# Patient Record
Sex: FEMALE | Race: ASIAN | NOT HISPANIC OR LATINO
[De-identification: names, ages, dates, MRNs, and addresses within clinical notes are randomized per-mention and may not be internally consistent; named-entity substitution may affect disease eponyms.]

---

## 2023-06-28 ENCOUNTER — APPOINTMENT (OUTPATIENT)
Dept: INTERNAL MEDICINE | Facility: CLINIC | Age: 68
End: 2023-06-28

## 2024-02-02 ENCOUNTER — APPOINTMENT (OUTPATIENT)
Dept: INTERNAL MEDICINE | Facility: CLINIC | Age: 69
End: 2024-02-02
Payer: MEDICARE

## 2024-02-02 VITALS
OXYGEN SATURATION: 98 % | DIASTOLIC BLOOD PRESSURE: 80 MMHG | WEIGHT: 175 LBS | SYSTOLIC BLOOD PRESSURE: 146 MMHG | HEART RATE: 78 BPM | TEMPERATURE: 98.5 F

## 2024-02-02 DIAGNOSIS — M19.019 PRIMARY OSTEOARTHRITIS, UNSPECIFIED SHOULDER: ICD-10-CM

## 2024-02-02 DIAGNOSIS — Z00.00 ENCOUNTER FOR GENERAL ADULT MEDICAL EXAMINATION W/OUT ABNORMAL FINDINGS: ICD-10-CM

## 2024-02-02 DIAGNOSIS — Z80.9 FAMILY HISTORY OF MALIGNANT NEOPLASM, UNSPECIFIED: ICD-10-CM

## 2024-02-02 DIAGNOSIS — M17.0 BILATERAL PRIMARY OSTEOARTHRITIS OF KNEE: ICD-10-CM

## 2024-02-02 DIAGNOSIS — M19.049 PRIMARY OSTEOARTHRITIS, UNSPECIFIED HAND: ICD-10-CM

## 2024-02-02 DIAGNOSIS — Z63.4 DISAPPEARANCE AND DEATH OF FAMILY MEMBER: ICD-10-CM

## 2024-02-02 DIAGNOSIS — Z23 ENCOUNTER FOR IMMUNIZATION: ICD-10-CM

## 2024-02-02 DIAGNOSIS — Z84.89 FAMILY HISTORY OF OTHER SPECIFIED CONDITIONS: ICD-10-CM

## 2024-02-02 DIAGNOSIS — Z83.3 FAMILY HISTORY OF DIABETES MELLITUS: ICD-10-CM

## 2024-02-02 PROCEDURE — 36415 COLL VENOUS BLD VENIPUNCTURE: CPT

## 2024-02-02 PROCEDURE — G0439: CPT

## 2024-02-02 RX ORDER — PEN NEEDLE, DIABETIC 33 GX5/32"
NEEDLE, DISPOSABLE MISCELLANEOUS
Qty: 1 | Refills: 0 | Status: ACTIVE | COMMUNITY
Start: 2024-02-02 | End: 1900-01-01

## 2024-02-02 RX ORDER — UBIDECARENONE 30 MG
CAPSULE ORAL DAILY
Qty: 90 | Refills: 3 | Status: ACTIVE | COMMUNITY
Start: 2024-02-02 | End: 1900-01-01

## 2024-02-02 RX ORDER — HYDROXYCHLOROQUINE SULFATE 200 MG/1
200 TABLET, FILM COATED ORAL
Refills: 0 | Status: ACTIVE | COMMUNITY

## 2024-02-02 RX ORDER — PREDNISONE 5 MG/1
5 TABLET ORAL
Refills: 0 | Status: ACTIVE | COMMUNITY

## 2024-02-02 RX ORDER — DICLOFENAC SODIUM 1% 10 MG/G
1 GEL TOPICAL
Qty: 100 | Refills: 3 | Status: ACTIVE | COMMUNITY
Start: 2024-02-02 | End: 1900-01-01

## 2024-02-02 SDOH — SOCIAL STABILITY - SOCIAL INSECURITY: DISSAPEARANCE AND DEATH OF FAMILY MEMBER: Z63.4

## 2024-02-02 NOTE — HISTORY OF PRESENT ILLNESS
[de-identified] : Seen as New pt to \[facility]\"" care  referred by Daughter My Pt   hx HTN x 8 yrs now - on lisinopril 20 BID   hx SLE -dx 8 yrs ago ON Plaquenil 200 qd and prednisone 5 qd - followed by Rheum Dr Lee Given [facility]  -on going joint pains   HX hypothyroidism - on levothyroxine

## 2024-02-02 NOTE — PHYSICAL EXAM
[No Acute Distress] : no acute distress [Well-Appearing] : well-appearing [Normal Sclera/Conjunctiva] : normal sclera/conjunctiva [PERRL] : pupils equal round and reactive to light [EOMI] : extraocular movements intact [Normal Outer Ear/Nose] : the outer ears and nose were normal in appearance [Normal Oropharynx] : the oropharynx was normal [No JVD] : no jugular venous distention [No Lymphadenopathy] : no lymphadenopathy [Supple] : supple [Thyroid Normal, No Nodules] : the thyroid was normal and there were no nodules present [No Respiratory Distress] : no respiratory distress  [No Accessory Muscle Use] : no accessory muscle use [Clear to Auscultation] : lungs were clear to auscultation bilaterally [Normal Rate] : normal rate  [Regular Rhythm] : with a regular rhythm [Normal S1, S2] : normal S1 and S2 [No Murmur] : no murmur heard [No Varicosities] : no varicosities [Pedal Pulses Present] : the pedal pulses are present [No Edema] : there was no peripheral edema [No Extremity Clubbing/Cyanosis] : no extremity clubbing/cyanosis [Soft] : abdomen soft [Non Tender] : non-tender [Non-distended] : non-distended [No Masses] : no abdominal mass palpated [No HSM] : no HSM [Normal Bowel Sounds] : normal bowel sounds [Normal Posterior Cervical Nodes] : no posterior cervical lymphadenopathy [Normal Anterior Cervical Nodes] : no anterior cervical lymphadenopathy [No CVA Tenderness] : no CVA  tenderness [No Spinal Tenderness] : no spinal tenderness [No Joint Swelling] : no joint swelling [Grossly Normal Strength/Tone] : grossly normal strength/tone [No Rash] : no rash [Coordination Grossly Intact] : coordination grossly intact [No Focal Deficits] : no focal deficits [Normal Gait] : normal gait [Deep Tendon Reflexes (DTR)] : deep tendon reflexes were 2+ and symmetric [Normal Affect] : the affect was normal [Normal Insight/Judgement] : insight and judgment were intact

## 2024-02-02 NOTE — HEALTH RISK ASSESSMENT
[Good] : ~his/her~  mood as  good [No] : In the past 12 months have you used drugs other than those required for medical reasons? No [No falls in past year] : Patient reported no falls in the past year [0] : 2) Feeling down, depressed, or hopeless: Not at all (0) [PHQ-2 Negative - No further assessment needed] : PHQ-2 Negative - No further assessment needed [Patient declined mammogram] : Patient declined mammogram [Patient declined PAP Smear] : Patient declined PAP Smear [Patient declined bone density test] : Patient declined bone density test [Patient declined colonoscopy] : Patient declined colonoscopy [HIV Test offered] : HIV Test offered [Hepatitis C test offered] : Hepatitis C test offered [Alone] : lives alone [With Family] : lives with family [Retired] : retired [Single] : single [Never] : Never [de-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

## 2024-02-02 NOTE — REVIEW OF SYSTEMS
[Fatigue] : fatigue [Joint Pain] : joint pain [Joint Stiffness] : joint stiffness [Joint Swelling] : joint swelling [Negative] : Heme/Lymph [Fever] : no fever [Chills] : no chills

## 2024-02-02 NOTE — ASSESSMENT
[FreeTextEntry1] : HTN- elevated BP readings confirmed  --no cp sob palpitation or dizzy spells , no leg swelling  -C/o ch cough - > 6 months ? lisinopril d/c - start irbesartan 300 qd - add amlodipine 5 qd - bp monitor given  - educated low salt diet , avoid canned , processed and fast food ,chips , bagged items ,  start exercise daily 30- 40 minutes  , loose weight  -f/u 4- 6 weeks check BP and BMP   Hypothyroid - on levo 100 rx renewed - TFT ordered   hx SLE- followed by rheum - records requested  -on prednisone 5 qd and plaquinil 200 qd   Strong fhs cad both brothers Mi in 50;s - cardio evaluation as c/o ACEVEDO   OA multiple joints - knee shoulders and hand- xary on pt portal reviewed  -diclofinac gel 1 % as needed  -MVi and B complex added  -pt refused injections- got once in past   Health maintenance  Mammo- ordered  colonoscope- refused - get FIT  PAP-due - gyn advised - referral given  Bone density -advised  HEP C screening- ordered  STD offered-HIv ordered  Dermatology consult advised -for skin ca screening  Flu vaccine -pt deferred  Prevnar 20 - 2/2/24  Shingrex -pt defereed  Covid vaccine -advised   RTC 1 month

## 2024-02-03 LAB — HIV1+2 AB SPEC QL IA.RAPID: NONREACTIVE

## 2024-02-05 LAB
ALBUMIN SERPL ELPH-MCNC: 4 G/DL
ALP BLD-CCNC: 69 U/L
ALT SERPL-CCNC: 31 U/L
ANION GAP SERPL CALC-SCNC: 18 MMOL/L
APPEARANCE: CLEAR
AST SERPL-CCNC: 32 U/L
BACTERIA: NEGATIVE /HPF
BASOPHILS # BLD AUTO: 0 K/UL
BASOPHILS NFR BLD AUTO: 0 %
BILIRUB SERPL-MCNC: 0.4 MG/DL
BILIRUBIN URINE: NEGATIVE
BLOOD URINE: NEGATIVE
BUN SERPL-MCNC: 15 MG/DL
CALCIUM SERPL-MCNC: 9.2 MG/DL
CAST: 0 /LPF
CHLORIDE SERPL-SCNC: 101 MMOL/L
CHOLEST SERPL-MCNC: 189 MG/DL
CO2 SERPL-SCNC: 21 MMOL/L
COLOR: YELLOW
CREAT SERPL-MCNC: 0.6 MG/DL
EGFR: 98 ML/MIN/1.73M2
EOSINOPHIL # BLD AUTO: 0.01 K/UL
EOSINOPHIL NFR BLD AUTO: 0.2 %
EPITHELIAL CELLS: 6 /HPF
ESTIMATED AVERAGE GLUCOSE: 154 MG/DL
FOLATE SERPL-MCNC: >20 NG/ML
GLUCOSE QUALITATIVE U: 250 MG/DL
GLUCOSE SERPL-MCNC: 255 MG/DL
HBA1C MFR BLD HPLC: 7 %
HBV SURFACE AB SER QL: NONREACTIVE
HBV SURFACE AG SER QL: NONREACTIVE
HCT VFR BLD CALC: 37.3 %
HCV AB SER QL: NONREACTIVE
HCV S/CO RATIO: 0.13 S/CO
HDLC SERPL-MCNC: 73 MG/DL
HGB BLD-MCNC: 12 G/DL
IMM GRANULOCYTES NFR BLD AUTO: 0.5 %
KETONES URINE: NEGATIVE MG/DL
LDLC SERPL CALC-MCNC: 89 MG/DL
LEUKOCYTE ESTERASE URINE: ABNORMAL
LYMPHOCYTES # BLD AUTO: 1.16 K/UL
LYMPHOCYTES NFR BLD AUTO: 18.4 %
MAN DIFF?: NORMAL
MCHC RBC-ENTMCNC: 26 PG
MCHC RBC-ENTMCNC: 32.2 GM/DL
MCV RBC AUTO: 80.9 FL
MICROSCOPIC-UA: NORMAL
MONOCYTES # BLD AUTO: 0.28 K/UL
MONOCYTES NFR BLD AUTO: 4.4 %
NEUTROPHILS # BLD AUTO: 4.82 K/UL
NEUTROPHILS NFR BLD AUTO: 76.5 %
NITRITE URINE: NEGATIVE
NONHDLC SERPL-MCNC: 116 MG/DL
PH URINE: 6
PLATELET # BLD AUTO: 254 K/UL
POTASSIUM SERPL-SCNC: 4.6 MMOL/L
PROT SERPL-MCNC: 7.3 G/DL
PROTEIN URINE: NEGATIVE MG/DL
RBC # BLD: 4.61 M/UL
RBC # FLD: 14 %
RED BLOOD CELLS URINE: 0 /HPF
SODIUM SERPL-SCNC: 140 MMOL/L
SPECIFIC GRAVITY URINE: 1.01
TRIGL SERPL-MCNC: 157 MG/DL
TSH SERPL-ACNC: 0.46 UIU/ML
UROBILINOGEN URINE: 0.2 MG/DL
VIT B12 SERPL-MCNC: 942 PG/ML
WBC # FLD AUTO: 6.3 K/UL
WHITE BLOOD CELLS URINE: 2 /HPF

## 2024-02-07 LAB
M TB IFN-G BLD-IMP: NEGATIVE
QUANTIFERON TB PLUS MITOGEN MINUS NIL: 6.53 IU/ML
QUANTIFERON TB PLUS NIL: 0.03 IU/ML
QUANTIFERON TB PLUS TB1 MINUS NIL: 0.18 IU/ML
QUANTIFERON TB PLUS TB2 MINUS NIL: 0.08 IU/ML

## 2024-02-15 LAB — HEMOCCULT STL QL IA: NEGATIVE

## 2024-02-28 ENCOUNTER — APPOINTMENT (OUTPATIENT)
Dept: INTERNAL MEDICINE | Facility: CLINIC | Age: 69
End: 2024-02-28
Payer: MEDICARE

## 2024-02-28 VITALS
WEIGHT: 178 LBS | HEIGHT: 59 IN | TEMPERATURE: 97.9 F | HEART RATE: 72 BPM | SYSTOLIC BLOOD PRESSURE: 116 MMHG | DIASTOLIC BLOOD PRESSURE: 77 MMHG | BODY MASS INDEX: 35.88 KG/M2 | OXYGEN SATURATION: 98 %

## 2024-02-28 DIAGNOSIS — E11.9 TYPE 2 DIABETES MELLITUS W/OUT COMPLICATIONS: ICD-10-CM

## 2024-02-28 DIAGNOSIS — Z23 ENCOUNTER FOR IMMUNIZATION: ICD-10-CM

## 2024-02-28 DIAGNOSIS — I70.0 ATHEROSCLEROSIS OF AORTA: ICD-10-CM

## 2024-02-28 DIAGNOSIS — M32.8 OTHER FORMS OF SYSTEMIC LUPUS ERYTHEMATOSUS: ICD-10-CM

## 2024-02-28 DIAGNOSIS — E03.9 HYPOTHYROIDISM, UNSPECIFIED: ICD-10-CM

## 2024-02-28 PROCEDURE — G0009: CPT

## 2024-02-28 PROCEDURE — G2211 COMPLEX E/M VISIT ADD ON: CPT

## 2024-02-28 PROCEDURE — 99214 OFFICE O/P EST MOD 30 MIN: CPT

## 2024-02-28 PROCEDURE — 90677 PCV20 VACCINE IM: CPT

## 2024-02-28 RX ORDER — CYANOCOBALAMIN/FOLIC AC/VIT B6 0.2-.5-5MG
500-5-200 TABLET ORAL DAILY
Qty: 90 | Refills: 3 | Status: COMPLETED | COMMUNITY
Start: 2024-02-02 | End: 2024-02-28

## 2024-02-28 RX ORDER — LANCETS 28 GAUGE
EACH MISCELLANEOUS
Qty: 1 | Refills: 6 | Status: ACTIVE | COMMUNITY
Start: 2024-02-09 | End: 1900-01-01

## 2024-02-28 RX ORDER — PEN NEEDLE, DIABETIC 33 GX5/32"
NEEDLE, DISPOSABLE MISCELLANEOUS
Qty: 1 | Refills: 0 | Status: ACTIVE | COMMUNITY
Start: 2024-02-28 | End: 1900-01-01

## 2024-02-28 RX ORDER — BLOOD-GLUCOSE METER
KIT MISCELLANEOUS
Qty: 1 | Refills: 0 | Status: ACTIVE | COMMUNITY
Start: 2024-02-09 | End: 1900-01-01

## 2024-02-28 RX ORDER — BLOOD SUGAR DIAGNOSTIC
STRIP MISCELLANEOUS
Qty: 60 | Refills: 6 | Status: ACTIVE | COMMUNITY
Start: 2024-02-09 | End: 1900-01-01

## 2024-02-28 RX ORDER — LISINOPRIL 20 MG/1
20 TABLET ORAL
Qty: 180 | Refills: 1 | Status: DISCONTINUED | COMMUNITY
End: 2024-02-28

## 2024-02-28 NOTE — ASSESSMENT
[FreeTextEntry1] : Atypical CP  - saw cardio 2/7/24  -has appt for stress test 3/1/24  -Abdominal US - 2/7/24- atherosclerotic aorta  -ECHO 2/7/24- EF 55 % Mod LVH , Mild Pulm HTN,  Carotid Doppler 2/7/24 - Mild plaque b/l   ASCVD- risk 21 % statins advised  LDL- 2/2024 89  Diabetis new onset Type 2 -AIc -7-2/22/24  on prednisone - started metformin  po BID with food -eat 1800 kcal ADA diet, cut  rice, pasta, sugar, sweet, soda, juices, exercise daily 30 minutes , loose weight   HTN-controlled now on current medications  --no cp sob palpitation or dizzy spells , no leg swelling  -C/o ch cough - > 6 months ? lisinopril d/c - started irbesartan 300 qd 2/22/4  - add amlodipine 5 qd - bp monitor given  - educated low salt diet , avoid canned , processed and fast food ,chips , bagged items ,  start exercise daily 30- 40 minutes  , loose weight  -f/u 4- 6 weeks check BP and BMP   Hypothyroid - on levo 100 rx renewed - TFT ordered   hx SLE- followed by rheum - records requested  -on prednisone 5 qd and plaquinil 200 qd   Strong fhs cad both brothers Mi in 50;s - cardio evaluation as c/o ACEVEDO   OA multiple joints - knee shoulders and hand- xary on pt portal reviewed  -diclofinac gel 1 % as needed  -MVi and B complex added  -pt refused injections- got once in past   Health maintenance  Mammo- ordered  colonoscope- refused -  FIT 2/12/24 neg   PAP-due - gyn advised - referral given  Bone density -advised  HEP C screening- ordered  STD offered-HIv ordered  Dermatology consult advised -for skin ca screening  Flu vaccine -pt deferred  Prevnar 20 - 2/28/24  Shingrex -pt defereed  Covid vaccine -advised   RTC 5/24

## 2024-02-28 NOTE — HISTORY OF PRESENT ILLNESS
[Other: _____] : [unfilled] [de-identified] : Seen as New pt to [facility] 2/2/24  referred by Daughter My Pt   hx HTN x 8 yrs now - started Irbesartan 300 qd BP better readings at home   hx SLE -dx 8 yrs ago ON Plaquenil 200 qd and prednisone 5 qd - followed by Rheum Dr Lee Given [facility]  -on going joint pains   HX hypothyroidism - on levothyroxine

## 2024-03-08 ENCOUNTER — TRANSCRIPTION ENCOUNTER (OUTPATIENT)
Age: 69
End: 2024-03-08

## 2024-04-07 ENCOUNTER — RX RENEWAL (OUTPATIENT)
Age: 69
End: 2024-04-07

## 2024-05-06 ENCOUNTER — APPOINTMENT (OUTPATIENT)
Dept: INTERNAL MEDICINE | Facility: CLINIC | Age: 69
End: 2024-05-06
Payer: MEDICARE

## 2024-05-06 DIAGNOSIS — I10 ESSENTIAL (PRIMARY) HYPERTENSION: ICD-10-CM

## 2024-05-06 DIAGNOSIS — K52.9 NONINFECTIVE GASTROENTERITIS AND COLITIS, UNSPECIFIED: ICD-10-CM

## 2024-05-06 PROCEDURE — 99442: CPT | Mod: 93

## 2024-05-06 RX ORDER — IRBESARTAN 300 MG/1
300 TABLET ORAL
Qty: 90 | Refills: 1 | Status: ACTIVE | COMMUNITY
Start: 2024-02-02 | End: 1900-01-01

## 2024-05-06 RX ORDER — LEVOTHYROXINE SODIUM 0.1 MG/1
100 TABLET ORAL
Qty: 90 | Refills: 1 | Status: ACTIVE | COMMUNITY
Start: 1900-01-01 | End: 1900-01-01

## 2024-05-06 RX ORDER — METFORMIN ER 500 MG 500 MG/1
500 TABLET ORAL
Qty: 180 | Refills: 2 | Status: ACTIVE | COMMUNITY
Start: 2024-02-05 | End: 1900-01-01

## 2024-05-06 RX ORDER — AMLODIPINE BESYLATE 5 MG/1
5 TABLET ORAL
Qty: 90 | Refills: 2 | Status: ACTIVE | COMMUNITY
Start: 2024-02-02 | End: 1900-01-01

## 2024-05-06 NOTE — HISTORY OF PRESENT ILLNESS
[Other Location: e.g. School (Enter Location, City,State)___] : at [unfilled], at the time of the visit. [Medical Office: ([facility])___] : at the medical office located in  [Verbal consent obtained from patient] : the patient, [unfilled] [de-identified] : Seen as New pt to [facility] 2/2/24 - today for f/u on ch issues  referred by Daughter My Pt   diarrhea since 1 week - loose stools discomfort cramps followed by BM  -no fever , no chills   hx HTN x 8 yrs now - started Irbesartan 300 qd BP better readings at home   hx SLE -dx 8 yrs ago ON Plaquenil 200 qd and prednisone 5 qd - followed by Rheum Dr Lee Given [facility]  -on going joint pains   HX hypothyroidism - on levothyroxine

## 2024-05-06 NOTE — ASSESSMENT
[FreeTextEntry1] :  diarrhea - 1 week - adv hydration with pedialyte  -mei diet  - if no improvement adv to see MD Nela Sauceda CP  - saw cardio 2/7/24  -did stress test 3/1/24  -Abdominal US - 2/7/24- atherosclerotic aorta  -ECHO 2/7/24- EF 55 % Mod LVH , Mild Pulm HTN,  Carotid Doppler 2/7/24 - Mild plaque b/l   ASCVD- risk 21 % statins advised  LDL- 2/2024 89  Diabetes new onset Type 2 -AIc -7-2/22/24  on prednisone - started metformin  po BID with food -eat 1800 kcal ADA diet, cut  rice, pasta, sugar, sweet, soda, juices, exercise daily 30 minutes , loose weight   HTN-controlled now on current medications  --no cp sob palpitation or dizzy spells , no leg swelling  -C/o ch cough - > 6 months ? lisinopril d/c - started irbesartan 300 qd 2/22/4  - add amlodipine 5 qd - bp monitor given  - educated low salt diet , avoid canned , processed and fast food ,chips , bagged items ,  start exercise daily 30- 40 minutes  , loose weight  -f/u 4- 6 weeks check BP and BMP   Hypothyroid - on levo 100 rx renewed - TFT ordered   hx SLE- followed by rheum - records requested  -on prednisone 5 qd and plaquinil 200 qd   Strong fhs cad both brothers Mi in 50;s - cardio evaluation as c/o ACEVEDO   OA multiple joints - knee shoulders and hand- xary on pt portal reviewed  -diclofinac gel 1 % as needed  -MVi and B complex added  -pt refused injections- got once in past   Health maintenance  Mammo- ordered  colonoscope- refused -  FIT 2/12/24 neg   PAP-due - gyn advised - referral given  Bone density -advised  HEP C screening- ordered  STD offered-HIv ordered  Dermatology consult advised -for skin ca screening  Flu vaccine -pt deferred  Prevnar 20 - 2/28/24  Shingrex -pt defereed  Covid vaccine -advised

## 2024-05-06 NOTE — REVIEW OF SYSTEMS
[Abdominal Pain] : abdominal pain [Nausea] : no nausea [Constipation] : no constipation [Diarrhea] : diarrhea [Melena] : no melena [Negative] : Respiratory

## 2024-05-08 RX ORDER — OMEPRAZOLE 40 MG/1
40 CAPSULE, DELAYED RELEASE ORAL
Qty: 30 | Refills: 2 | Status: ACTIVE | COMMUNITY
Start: 2024-02-02 | End: 1900-01-01

## 2024-07-03 ENCOUNTER — APPOINTMENT (OUTPATIENT)
Dept: INTERNAL MEDICINE | Facility: CLINIC | Age: 69
End: 2024-07-03

## 2024-07-15 ENCOUNTER — APPOINTMENT (OUTPATIENT)
Dept: INTERNAL MEDICINE | Facility: CLINIC | Age: 69
End: 2024-07-15
Payer: MEDICARE

## 2024-07-15 VITALS
HEIGHT: 59 IN | WEIGHT: 175 LBS | SYSTOLIC BLOOD PRESSURE: 130 MMHG | DIASTOLIC BLOOD PRESSURE: 82 MMHG | HEART RATE: 75 BPM | RESPIRATION RATE: 16 BRPM | BODY MASS INDEX: 35.28 KG/M2 | TEMPERATURE: 98.5 F | OXYGEN SATURATION: 98 %

## 2024-07-15 DIAGNOSIS — M75.101 UNSPECIFIED ROTATOR CUFF TEAR OR RUPTURE OF RIGHT SHOULDER, NOT SPECIFIED AS TRAUMATIC: ICD-10-CM

## 2024-07-15 DIAGNOSIS — E11.9 TYPE 2 DIABETES MELLITUS W/OUT COMPLICATIONS: ICD-10-CM

## 2024-07-15 DIAGNOSIS — E03.9 HYPOTHYROIDISM, UNSPECIFIED: ICD-10-CM

## 2024-07-15 DIAGNOSIS — I70.0 ATHEROSCLEROSIS OF AORTA: ICD-10-CM

## 2024-07-15 DIAGNOSIS — I10 ESSENTIAL (PRIMARY) HYPERTENSION: ICD-10-CM

## 2024-07-15 PROCEDURE — 99214 OFFICE O/P EST MOD 30 MIN: CPT

## 2024-07-15 PROCEDURE — G2211 COMPLEX E/M VISIT ADD ON: CPT

## 2024-07-15 PROCEDURE — 36415 COLL VENOUS BLD VENIPUNCTURE: CPT

## 2024-07-15 RX ORDER — ROSUVASTATIN CALCIUM 5 MG/1
5 TABLET, FILM COATED ORAL
Qty: 90 | Refills: 1 | Status: ACTIVE | COMMUNITY
Start: 2024-07-15 | End: 1900-01-01

## 2024-07-16 ENCOUNTER — TRANSCRIPTION ENCOUNTER (OUTPATIENT)
Age: 69
End: 2024-07-16

## 2024-07-16 LAB
ALBUMIN SERPL ELPH-MCNC: 4.3 G/DL
ALP BLD-CCNC: 70 U/L
ALT SERPL-CCNC: 29 U/L
ANION GAP SERPL CALC-SCNC: 14 MMOL/L
AST SERPL-CCNC: 24 U/L
BILIRUB SERPL-MCNC: 1.1 MG/DL
BUN SERPL-MCNC: 16 MG/DL
CALCIUM SERPL-MCNC: 9.5 MG/DL
CHLORIDE SERPL-SCNC: 102 MMOL/L
CO2 SERPL-SCNC: 26 MMOL/L
CREAT SERPL-MCNC: 0.67 MG/DL
EGFR: 95 ML/MIN/1.73M2
ESTIMATED AVERAGE GLUCOSE: 151 MG/DL
GLUCOSE SERPL-MCNC: 110 MG/DL
HBA1C MFR BLD HPLC: 6.9 %
POTASSIUM SERPL-SCNC: 4 MMOL/L
PROT SERPL-MCNC: 7.5 G/DL
SODIUM SERPL-SCNC: 142 MMOL/L
TSH SERPL-ACNC: 3.19 UIU/ML

## 2024-07-24 ENCOUNTER — RX RENEWAL (OUTPATIENT)
Age: 69
End: 2024-07-24

## 2024-08-19 ENCOUNTER — APPOINTMENT (OUTPATIENT)
Dept: INTERNAL MEDICINE | Facility: CLINIC | Age: 69
End: 2024-08-19
Payer: MEDICARE

## 2024-08-19 VITALS
TEMPERATURE: 97.8 F | RESPIRATION RATE: 16 BRPM | OXYGEN SATURATION: 98 % | SYSTOLIC BLOOD PRESSURE: 121 MMHG | DIASTOLIC BLOOD PRESSURE: 78 MMHG | HEART RATE: 72 BPM | HEIGHT: 59 IN

## 2024-08-19 DIAGNOSIS — I83.893 VARICOSE VEINS OF BILATERAL LOWER EXTREMITIES WITH OTHER COMPLICATIONS: ICD-10-CM

## 2024-08-19 DIAGNOSIS — R22.43 LOCALIZED SWELLING, MASS AND LUMP, LOWER LIMB, BILATERAL: ICD-10-CM

## 2024-08-19 DIAGNOSIS — I10 ESSENTIAL (PRIMARY) HYPERTENSION: ICD-10-CM

## 2024-08-19 DIAGNOSIS — E11.9 TYPE 2 DIABETES MELLITUS W/OUT COMPLICATIONS: ICD-10-CM

## 2024-08-19 DIAGNOSIS — M17.0 BILATERAL PRIMARY OSTEOARTHRITIS OF KNEE: ICD-10-CM

## 2024-08-19 DIAGNOSIS — E03.9 HYPOTHYROIDISM, UNSPECIFIED: ICD-10-CM

## 2024-08-19 PROCEDURE — 99214 OFFICE O/P EST MOD 30 MIN: CPT

## 2024-08-19 PROCEDURE — G2211 COMPLEX E/M VISIT ADD ON: CPT

## 2024-08-19 RX ORDER — FLASH GLUCOSE SCANNING READER
EACH MISCELLANEOUS
Qty: 1 | Refills: 0 | Status: ACTIVE | COMMUNITY
Start: 2024-08-19 | End: 1900-01-01

## 2024-08-19 RX ORDER — FLASH GLUCOSE SENSOR
KIT MISCELLANEOUS
Qty: 6 | Refills: 3 | Status: ACTIVE | COMMUNITY
Start: 2024-08-19 | End: 1900-01-01

## 2024-08-19 NOTE — ASSESSMENT
[FreeTextEntry1] : B/L leg swelling varicose veins  - vascular eval  compression stockings -low salt diet , leg elevation   Pain C spine and right shoulder - ROM restricted - saw Ortho - did MRI rt shoulder reviewed on pt phone - at [facility] 7/12/24 - high grade supraspinatus tear - unable to do ROM exercises  MRI C spine DJD   Atypical CP  - saw cardio 2/7/24  -did stress test 3/1/24  -Abdominal US - 2/7/24- atherosclerotic aorta  -ECHO 2/7/24- EF 55 % Mod LVH , Mild Pulm HTN,  Carotid Doppler 2/7/24 - Mild plaque b/l   ASCVD- risk 21 % statins advised - Crestor 5 mg po qd  LDL- 2/2024 89  Diabetes new onset Type 2 -AIc -7-2/22/24  --> 6.9 7/24 on prednisone - started metformin  po BID with food -eat 1800 kcal ADA diet, cut  rice, pasta, sugar, sweet, soda, juices, exercise daily 30 minutes , loose weight   HTN-controlled now on current medications  --no cp sob palpitation or dizzy spells , no leg swelling  -C/o ch cough - > 6 months ? lisinopril d/c - started irbesartan 300 qd 2/22/4  - add amlodipine 5 qd - bp monitor given (cough resolved ) - educated low salt diet , avoid canned , processed and fast food ,chips , bagged items ,  start exercise daily 30- 40 minutes  , loose weight   Hypothyroid - on levo 100 rx renewed - TFT nl 7/24   hx SLE- followed by rheum - records requested  -on prednisone 5 qd and plaquinil 200 qd   Strong fhs cad both brothers Mi in 50;s - cardio evaluation as c/o ACEVEDO   OA multiple joints - knee shoulders and hand- xary on pt portal reviewed - knee brace given  -diclofinac gel 1 % as needed  -MVi and B complex added  -pt refused injections- got once in past   Health maintenance  Mammo- ordered pending daughter aware  colonoscope- refused -  FIT 2/12/24 neg   PAP-due - gyn advised - referral given  Bone density -advised  HEP C screening-2/2024 neg STD offered-HIv 2/2024 neg  Dermatology consult advised -for skin ca screening  Flu vaccine -pt deferred  Prevnar 20 - 2/28/24  Shingrex -pt defereed  Covid vaccine -advised

## 2024-08-19 NOTE — HISTORY OF PRESENT ILLNESS
[Other: _____] : [unfilled] [de-identified] :  today for f/u on ch issues  Seen as New pt to [facility] 2/2/24 - referred by Daughter My Pt   c/o swelling both legs 2 months ago    right shoulder - ROM restricted - saw Ortho - did MRI rt shoulder - at [facility] 7/12/24 - high grade supraspinatus tear - unable to do ROM exercises   hx HTN x 8 yrs now - started Irbesartan 300 qd BP better readings at home   hx SLE -dx 8 yrs ago ON Plaquenil 200 qd and prednisone 5 qd - followed by Rheum Dr Lee Given [facility]  -on going joint pains   HX hypothyroidism - on levothyroxine

## 2024-09-11 ENCOUNTER — APPOINTMENT (OUTPATIENT)
Dept: INTERNAL MEDICINE | Facility: CLINIC | Age: 69
End: 2024-09-11
Payer: MEDICARE

## 2024-09-11 VITALS
WEIGHT: 176 LBS | BODY MASS INDEX: 35.48 KG/M2 | OXYGEN SATURATION: 96 % | HEIGHT: 59 IN | HEART RATE: 69 BPM | TEMPERATURE: 98.3 F | SYSTOLIC BLOOD PRESSURE: 112 MMHG | DIASTOLIC BLOOD PRESSURE: 72 MMHG

## 2024-09-11 DIAGNOSIS — G47.9 SLEEP DISORDER, UNSPECIFIED: ICD-10-CM

## 2024-09-11 DIAGNOSIS — T30.0 BURN OF UNSPECIFIED BODY REGION, UNSPECIFIED DEGREE: ICD-10-CM

## 2024-09-11 DIAGNOSIS — F41.9 ANXIETY DISORDER, UNSPECIFIED: ICD-10-CM

## 2024-09-11 DIAGNOSIS — E11.9 TYPE 2 DIABETES MELLITUS W/OUT COMPLICATIONS: ICD-10-CM

## 2024-09-11 PROCEDURE — 99214 OFFICE O/P EST MOD 30 MIN: CPT

## 2024-09-11 RX ORDER — MIRTAZAPINE 7.5 MG/1
7.5 TABLET, FILM COATED ORAL
Qty: 30 | Refills: 1 | Status: ACTIVE | COMMUNITY
Start: 2024-09-11 | End: 1900-01-01

## 2024-09-11 RX ORDER — PETROLATUM,WHITE 41 %
OINTMENT (GRAM) TOPICAL TWICE DAILY
Qty: 1 | Refills: 0 | Status: ACTIVE | COMMUNITY
Start: 2024-09-11 | End: 1900-01-01

## 2024-09-18 RX ORDER — LIDOCAINE 40 MG/G
4 PATCH TOPICAL
Qty: 1 | Refills: 1 | Status: ACTIVE | COMMUNITY
Start: 2024-09-11 | End: 1900-01-01

## 2024-10-14 ENCOUNTER — TRANSCRIPTION ENCOUNTER (OUTPATIENT)
Age: 69
End: 2024-10-14

## 2024-10-22 ENCOUNTER — NON-APPOINTMENT (OUTPATIENT)
Age: 69
End: 2024-10-22

## 2024-10-22 ENCOUNTER — APPOINTMENT (OUTPATIENT)
Dept: INTERNAL MEDICINE | Facility: CLINIC | Age: 69
End: 2024-10-22
Payer: MEDICARE

## 2024-10-22 VITALS
TEMPERATURE: 98.5 F | OXYGEN SATURATION: 96 % | BODY MASS INDEX: 35.88 KG/M2 | SYSTOLIC BLOOD PRESSURE: 126 MMHG | WEIGHT: 178 LBS | HEART RATE: 89 BPM | DIASTOLIC BLOOD PRESSURE: 82 MMHG | HEIGHT: 59 IN

## 2024-10-22 DIAGNOSIS — E03.9 HYPOTHYROIDISM, UNSPECIFIED: ICD-10-CM

## 2024-10-22 DIAGNOSIS — M19.049 PRIMARY OSTEOARTHRITIS, UNSPECIFIED HAND: ICD-10-CM

## 2024-10-22 DIAGNOSIS — R22.43 LOCALIZED SWELLING, MASS AND LUMP, LOWER LIMB, BILATERAL: ICD-10-CM

## 2024-10-22 DIAGNOSIS — E11.9 TYPE 2 DIABETES MELLITUS W/OUT COMPLICATIONS: ICD-10-CM

## 2024-10-22 DIAGNOSIS — Z87.898 PERSONAL HISTORY OF OTHER SPECIFIED CONDITIONS: ICD-10-CM

## 2024-10-22 DIAGNOSIS — M17.0 BILATERAL PRIMARY OSTEOARTHRITIS OF KNEE: ICD-10-CM

## 2024-10-22 DIAGNOSIS — I10 ESSENTIAL (PRIMARY) HYPERTENSION: ICD-10-CM

## 2024-10-22 LAB — HBA1C MFR BLD HPLC: 6.9

## 2024-10-22 PROCEDURE — 36415 COLL VENOUS BLD VENIPUNCTURE: CPT

## 2024-10-22 PROCEDURE — 99214 OFFICE O/P EST MOD 30 MIN: CPT

## 2024-10-22 PROCEDURE — G2211 COMPLEX E/M VISIT ADD ON: CPT

## 2024-10-22 PROCEDURE — 83036 HEMOGLOBIN GLYCOSYLATED A1C: CPT | Mod: QW

## 2024-10-22 RX ORDER — GINGER 500 MG
50 CAPSULE ORAL
Qty: 1 | Refills: 1 | Status: ACTIVE | COMMUNITY
Start: 2024-10-22 | End: 1900-01-01

## 2024-10-23 ENCOUNTER — RX RENEWAL (OUTPATIENT)
Age: 69
End: 2024-10-23

## 2024-10-23 DIAGNOSIS — R79.89 OTHER SPECIFIED ABNORMAL FINDINGS OF BLOOD CHEMISTRY: ICD-10-CM

## 2024-10-23 LAB
25(OH)D3 SERPL-MCNC: 25.1 NG/ML
ALBUMIN SERPL ELPH-MCNC: 4.1 G/DL
ALP BLD-CCNC: 79 U/L
ALT SERPL-CCNC: 30 U/L
ANION GAP SERPL CALC-SCNC: 16 MMOL/L
AST SERPL-CCNC: 30 U/L
BILIRUB SERPL-MCNC: 0.8 MG/DL
BUN SERPL-MCNC: 13 MG/DL
CALCIUM SERPL-MCNC: 9.7 MG/DL
CHLORIDE SERPL-SCNC: 99 MMOL/L
CO2 SERPL-SCNC: 26 MMOL/L
CREAT SERPL-MCNC: 0.68 MG/DL
EGFR: 95 ML/MIN/1.73M2
GLUCOSE SERPL-MCNC: 113 MG/DL
POTASSIUM SERPL-SCNC: 4.2 MMOL/L
PROT SERPL-MCNC: 7.6 G/DL
SODIUM SERPL-SCNC: 141 MMOL/L
TSH SERPL-ACNC: 1.52 UIU/ML

## 2024-10-23 RX ORDER — CHOLECALCIFEROL (VITAMIN D3) 50 MCG
50 MCG TABLET ORAL
Qty: 90 | Refills: 3 | Status: ACTIVE | COMMUNITY
Start: 2024-10-23 | End: 1900-01-01

## 2024-11-13 ENCOUNTER — TRANSCRIPTION ENCOUNTER (OUTPATIENT)
Age: 69
End: 2024-11-13

## 2024-11-14 ENCOUNTER — TRANSCRIPTION ENCOUNTER (OUTPATIENT)
Age: 69
End: 2024-11-14

## 2024-11-19 ENCOUNTER — APPOINTMENT (OUTPATIENT)
Dept: INTERNAL MEDICINE | Facility: CLINIC | Age: 69
End: 2024-11-19

## 2025-01-06 ENCOUNTER — APPOINTMENT (OUTPATIENT)
Dept: INTERNAL MEDICINE | Facility: CLINIC | Age: 70
End: 2025-01-06
Payer: MEDICARE

## 2025-01-06 VITALS
HEIGHT: 59 IN | TEMPERATURE: 97.7 F | HEART RATE: 73 BPM | WEIGHT: 176 LBS | SYSTOLIC BLOOD PRESSURE: 147 MMHG | BODY MASS INDEX: 35.48 KG/M2 | OXYGEN SATURATION: 96 % | DIASTOLIC BLOOD PRESSURE: 82 MMHG

## 2025-01-06 VITALS — SYSTOLIC BLOOD PRESSURE: 143 MMHG | DIASTOLIC BLOOD PRESSURE: 82 MMHG

## 2025-01-06 DIAGNOSIS — I10 ESSENTIAL (PRIMARY) HYPERTENSION: ICD-10-CM

## 2025-01-06 DIAGNOSIS — M25.552 PAIN IN LEFT HIP: ICD-10-CM

## 2025-01-06 DIAGNOSIS — R19.5 OTHER FECAL ABNORMALITIES: ICD-10-CM

## 2025-01-06 DIAGNOSIS — R10.11 RIGHT UPPER QUADRANT PAIN: ICD-10-CM

## 2025-01-06 DIAGNOSIS — M17.0 BILATERAL PRIMARY OSTEOARTHRITIS OF KNEE: ICD-10-CM

## 2025-01-06 DIAGNOSIS — M75.101 UNSPECIFIED ROTATOR CUFF TEAR OR RUPTURE OF RIGHT SHOULDER, NOT SPECIFIED AS TRAUMATIC: ICD-10-CM

## 2025-01-06 DIAGNOSIS — M19.049 PRIMARY OSTEOARTHRITIS, UNSPECIFIED HAND: ICD-10-CM

## 2025-01-06 PROCEDURE — 36415 COLL VENOUS BLD VENIPUNCTURE: CPT

## 2025-01-06 PROCEDURE — 99214 OFFICE O/P EST MOD 30 MIN: CPT

## 2025-01-07 DIAGNOSIS — E11.9 TYPE 2 DIABETES MELLITUS W/OUT COMPLICATIONS: ICD-10-CM

## 2025-01-07 LAB
ALBUMIN SERPL ELPH-MCNC: 4 G/DL
ALP BLD-CCNC: 77 U/L
ALT SERPL-CCNC: 29 U/L
ANION GAP SERPL CALC-SCNC: 12 MMOL/L
AST SERPL-CCNC: 29 U/L
BILIRUB SERPL-MCNC: 0.8 MG/DL
BUN SERPL-MCNC: 12 MG/DL
CALCIUM SERPL-MCNC: 9.7 MG/DL
CHLORIDE SERPL-SCNC: 102 MMOL/L
CO2 SERPL-SCNC: 27 MMOL/L
CREAT SERPL-MCNC: 0.63 MG/DL
EGFR: 96 ML/MIN/1.73M2
ESTIMATED AVERAGE GLUCOSE: 160 MG/DL
GLUCOSE SERPL-MCNC: 82 MG/DL
HBA1C MFR BLD HPLC: 7.2 %
HCT VFR BLD CALC: 40.3 %
HGB BLD-MCNC: 12.2 G/DL
MCHC RBC-ENTMCNC: 25.3 PG
MCHC RBC-ENTMCNC: 30.3 G/DL
MCV RBC AUTO: 83.6 FL
PLATELET # BLD AUTO: 314 K/UL
POTASSIUM SERPL-SCNC: 4.6 MMOL/L
PROT SERPL-MCNC: 7.3 G/DL
RBC # BLD: 4.82 M/UL
RBC # FLD: 14.3 %
SODIUM SERPL-SCNC: 141 MMOL/L
WBC # FLD AUTO: 4.85 K/UL

## 2025-01-10 RX ORDER — SITAGLIPTIN 50 MG/1
50 TABLET, FILM COATED ORAL
Qty: 60 | Refills: 0 | Status: ACTIVE | COMMUNITY
Start: 2025-01-07 | End: 1900-01-01

## 2025-01-21 ENCOUNTER — APPOINTMENT (OUTPATIENT)
Dept: INTERNAL MEDICINE | Facility: CLINIC | Age: 70
End: 2025-01-21
Payer: MEDICARE

## 2025-01-21 DIAGNOSIS — Z88.9 ALLERGY STATUS TO UNSPECIFIED DRUGS, MEDICAMENTS AND BIOLOGICAL SUBSTANCES: ICD-10-CM

## 2025-01-21 DIAGNOSIS — R22.0 LOCALIZED SWELLING, MASS AND LUMP, HEAD: ICD-10-CM

## 2025-01-21 PROCEDURE — 99213 OFFICE O/P EST LOW 20 MIN: CPT | Mod: 2W

## 2025-01-29 ENCOUNTER — TRANSCRIPTION ENCOUNTER (OUTPATIENT)
Age: 70
End: 2025-01-29

## 2025-01-31 ENCOUNTER — TRANSCRIPTION ENCOUNTER (OUTPATIENT)
Age: 70
End: 2025-01-31

## 2025-02-07 ENCOUNTER — APPOINTMENT (OUTPATIENT)
Dept: INTERNAL MEDICINE | Facility: CLINIC | Age: 70
End: 2025-02-07
Payer: MEDICARE

## 2025-02-07 VITALS
WEIGHT: 176 LBS | DIASTOLIC BLOOD PRESSURE: 76 MMHG | BODY MASS INDEX: 35.55 KG/M2 | HEART RATE: 74 BPM | SYSTOLIC BLOOD PRESSURE: 146 MMHG | OXYGEN SATURATION: 98 %

## 2025-02-07 DIAGNOSIS — I10 ESSENTIAL (PRIMARY) HYPERTENSION: ICD-10-CM

## 2025-02-07 DIAGNOSIS — I27.20 PULMONARY HYPERTENSION, UNSPECIFIED: ICD-10-CM

## 2025-02-07 DIAGNOSIS — M32.8 OTHER FORMS OF SYSTEMIC LUPUS ERYTHEMATOSUS: ICD-10-CM

## 2025-02-07 DIAGNOSIS — M17.0 BILATERAL PRIMARY OSTEOARTHRITIS OF KNEE: ICD-10-CM

## 2025-02-07 DIAGNOSIS — M79.646 PAIN IN UNSPECIFIED FINGER(S): ICD-10-CM

## 2025-02-07 DIAGNOSIS — I70.0 ATHEROSCLEROSIS OF AORTA: ICD-10-CM

## 2025-02-07 DIAGNOSIS — J06.9 ACUTE UPPER RESPIRATORY INFECTION, UNSPECIFIED: ICD-10-CM

## 2025-02-07 DIAGNOSIS — E11.9 TYPE 2 DIABETES MELLITUS W/OUT COMPLICATIONS: ICD-10-CM

## 2025-02-07 PROCEDURE — 99214 OFFICE O/P EST MOD 30 MIN: CPT

## 2025-02-07 RX ORDER — GLUCOSAMINE HCL/CHONDROITIN SU 500-400 MG
CAPSULE ORAL
Qty: 1 | Refills: 0 | Status: ACTIVE | COMMUNITY
Start: 2025-02-07 | End: 1900-01-01

## 2025-02-07 RX ORDER — IPRATROPIUM BROMIDE 42 UG/1
0.06 SPRAY, METERED NASAL
Qty: 1 | Refills: 0 | Status: ACTIVE | COMMUNITY
Start: 2025-02-07 | End: 1900-01-01

## 2025-02-07 RX ORDER — ACETAMINOPHEN ER 650 MG TABLET,EXTENDED RELEASE 650 MG
650 TABLET, EXTENDED RELEASE ORAL 4 TIMES DAILY
Qty: 120 | Refills: 3 | Status: ACTIVE | COMMUNITY
Start: 2025-02-07 | End: 1900-01-01

## 2025-02-07 RX ORDER — BENZONATATE 100 MG/1
100 CAPSULE ORAL
Qty: 1 | Refills: 0 | Status: ACTIVE | COMMUNITY
Start: 2025-02-07 | End: 1900-01-01

## 2025-02-07 RX ORDER — DICLOFENAC SODIUM 10 MG/G
1 GEL TOPICAL
Qty: 1 | Refills: 3 | Status: ACTIVE | COMMUNITY
Start: 2025-02-07 | End: 1900-01-01

## 2025-02-07 RX ORDER — BLOOD-GLUCOSE METER
KIT MISCELLANEOUS
Qty: 1 | Refills: 0 | Status: ACTIVE | COMMUNITY
Start: 2025-02-07 | End: 1900-01-01

## 2025-02-09 LAB
INFLUENZA A RESULT: NOT DETECTED
INFLUENZA B RESULT: NOT DETECTED
RESP SYN VIRUS RESULT: NOT DETECTED
SARS-COV-2 RESULT: NOT DETECTED

## 2025-02-24 ENCOUNTER — RX RENEWAL (OUTPATIENT)
Age: 70
End: 2025-02-24

## 2025-03-22 ENCOUNTER — TRANSCRIPTION ENCOUNTER (OUTPATIENT)
Age: 70
End: 2025-03-22

## 2025-03-24 ENCOUNTER — TRANSCRIPTION ENCOUNTER (OUTPATIENT)
Age: 70
End: 2025-03-24

## 2025-03-24 RX ORDER — LIDOCAINE 5% 700 MG/1
5 PATCH TOPICAL
Qty: 1 | Refills: 5 | Status: ACTIVE | COMMUNITY
Start: 2025-03-24 | End: 1900-01-01

## 2025-04-01 RX ORDER — LIDOCAINE 40 MG/G
4 PATCH TOPICAL
Qty: 1 | Refills: 3 | Status: ACTIVE | COMMUNITY
Start: 2025-04-01 | End: 1900-01-01

## 2025-04-03 ENCOUNTER — TRANSCRIPTION ENCOUNTER (OUTPATIENT)
Age: 70
End: 2025-04-03

## 2025-04-03 DIAGNOSIS — M19.019 PRIMARY OSTEOARTHRITIS, UNSPECIFIED SHOULDER: ICD-10-CM

## 2025-04-03 DIAGNOSIS — M19.049 PRIMARY OSTEOARTHRITIS, UNSPECIFIED HAND: ICD-10-CM

## 2025-04-03 DIAGNOSIS — M25.552 PAIN IN LEFT HIP: ICD-10-CM

## 2025-04-03 DIAGNOSIS — M17.0 BILATERAL PRIMARY OSTEOARTHRITIS OF KNEE: ICD-10-CM

## 2025-04-22 ENCOUNTER — NON-APPOINTMENT (OUTPATIENT)
Age: 70
End: 2025-04-22

## 2025-05-02 ENCOUNTER — NON-APPOINTMENT (OUTPATIENT)
Age: 70
End: 2025-05-02

## 2025-05-02 ENCOUNTER — APPOINTMENT (OUTPATIENT)
Dept: INTERNAL MEDICINE | Facility: CLINIC | Age: 70
End: 2025-05-02
Payer: MEDICARE

## 2025-05-02 ENCOUNTER — LABORATORY RESULT (OUTPATIENT)
Age: 70
End: 2025-05-02

## 2025-05-02 VITALS
HEART RATE: 74 BPM | BODY MASS INDEX: 34.07 KG/M2 | SYSTOLIC BLOOD PRESSURE: 114 MMHG | HEIGHT: 59 IN | TEMPERATURE: 98.1 F | OXYGEN SATURATION: 96 % | DIASTOLIC BLOOD PRESSURE: 75 MMHG | WEIGHT: 169 LBS

## 2025-05-02 DIAGNOSIS — E11.9 TYPE 2 DIABETES MELLITUS W/OUT COMPLICATIONS: ICD-10-CM

## 2025-05-02 DIAGNOSIS — I70.0 ATHEROSCLEROSIS OF AORTA: ICD-10-CM

## 2025-05-02 DIAGNOSIS — Z00.00 ENCOUNTER FOR GENERAL ADULT MEDICAL EXAMINATION W/OUT ABNORMAL FINDINGS: ICD-10-CM

## 2025-05-02 DIAGNOSIS — I10 ESSENTIAL (PRIMARY) HYPERTENSION: ICD-10-CM

## 2025-05-02 DIAGNOSIS — M32.8 OTHER FORMS OF SYSTEMIC LUPUS ERYTHEMATOSUS: ICD-10-CM

## 2025-05-02 DIAGNOSIS — I27.20 PULMONARY HYPERTENSION, UNSPECIFIED: ICD-10-CM

## 2025-05-02 LAB — HBA1C MFR BLD HPLC: 6.9

## 2025-05-02 PROCEDURE — 90739 HEPB VACC 2/4 DOSE ADULT IM: CPT

## 2025-05-02 PROCEDURE — 83036 HEMOGLOBIN GLYCOSYLATED A1C: CPT | Mod: QW

## 2025-05-02 PROCEDURE — 99214 OFFICE O/P EST MOD 30 MIN: CPT | Mod: 25

## 2025-05-02 PROCEDURE — G0439: CPT

## 2025-05-02 PROCEDURE — 93000 ELECTROCARDIOGRAM COMPLETE: CPT

## 2025-05-02 PROCEDURE — G0010: CPT

## 2025-05-05 DIAGNOSIS — E03.9 HYPOTHYROIDISM, UNSPECIFIED: ICD-10-CM

## 2025-05-05 LAB
CHOLEST SERPL-MCNC: 180 MG/DL
HDLC SERPL-MCNC: 73 MG/DL
LDLC SERPL-MCNC: 88 MG/DL
MEV IGG FLD QL IA: >300 AU/ML
MEV IGG+IGM SER-IMP: POSITIVE
MUV AB SER-ACNC: POSITIVE
MUV IGG SER QL IA: >300 AU/ML
NONHDLC SERPL-MCNC: 108 MG/DL
RUBV IGG FLD-ACNC: 12.7 INDEX
RUBV IGG SER-IMP: POSITIVE
TRIGL SERPL-MCNC: 110 MG/DL
TSH SERPL-ACNC: 7.45 UIU/ML
VIT B12 SERPL-MCNC: 933 PG/ML

## 2025-05-19 ENCOUNTER — TRANSCRIPTION ENCOUNTER (OUTPATIENT)
Age: 70
End: 2025-05-19

## 2025-05-19 DIAGNOSIS — M19.019 PRIMARY OSTEOARTHRITIS, UNSPECIFIED SHOULDER: ICD-10-CM

## 2025-05-21 ENCOUNTER — TRANSCRIPTION ENCOUNTER (OUTPATIENT)
Age: 70
End: 2025-05-21

## 2025-05-29 RX ORDER — BLOOD-GLUCOSE SENSOR
EACH MISCELLANEOUS
Qty: 6 | Refills: 0 | Status: ACTIVE | COMMUNITY
Start: 2025-05-29 | End: 1900-01-01

## 2025-07-21 ENCOUNTER — TRANSCRIPTION ENCOUNTER (OUTPATIENT)
Age: 70
End: 2025-07-21

## 2025-07-22 ENCOUNTER — TRANSCRIPTION ENCOUNTER (OUTPATIENT)
Age: 70
End: 2025-07-22

## 2025-07-22 DIAGNOSIS — M19.019 PRIMARY OSTEOARTHRITIS, UNSPECIFIED SHOULDER: ICD-10-CM

## 2025-07-22 DIAGNOSIS — M25.552 PAIN IN LEFT HIP: ICD-10-CM

## 2025-07-22 DIAGNOSIS — M19.049 PRIMARY OSTEOARTHRITIS, UNSPECIFIED HAND: ICD-10-CM

## 2025-07-22 DIAGNOSIS — M75.101 UNSPECIFIED ROTATOR CUFF TEAR OR RUPTURE OF RIGHT SHOULDER, NOT SPECIFIED AS TRAUMATIC: ICD-10-CM

## 2025-07-22 DIAGNOSIS — M17.0 BILATERAL PRIMARY OSTEOARTHRITIS OF KNEE: ICD-10-CM

## 2025-08-07 ENCOUNTER — APPOINTMENT (OUTPATIENT)
Dept: MAMMOGRAPHY | Facility: IMAGING CENTER | Age: 70
End: 2025-08-07
Payer: MEDICARE

## 2025-08-07 ENCOUNTER — RESULT REVIEW (OUTPATIENT)
Age: 70
End: 2025-08-07

## 2025-08-07 ENCOUNTER — OUTPATIENT (OUTPATIENT)
Dept: OUTPATIENT SERVICES | Facility: HOSPITAL | Age: 70
LOS: 1 days | End: 2025-08-07
Payer: MEDICARE

## 2025-08-07 DIAGNOSIS — Z00.00 ENCOUNTER FOR GENERAL ADULT MEDICAL EXAMINATION W/OUT ABNORMAL FINDINGS: ICD-10-CM

## 2025-08-07 DIAGNOSIS — Z00.00 ENCOUNTER FOR GENERAL ADULT MEDICAL EXAMINATION WITHOUT ABNORMAL FINDINGS: ICD-10-CM

## 2025-08-07 PROCEDURE — 77063 BREAST TOMOSYNTHESIS BI: CPT | Mod: 26

## 2025-08-07 PROCEDURE — 77063 BREAST TOMOSYNTHESIS BI: CPT

## 2025-08-07 PROCEDURE — 77067 SCR MAMMO BI INCL CAD: CPT | Mod: 26

## 2025-08-07 PROCEDURE — 77067 SCR MAMMO BI INCL CAD: CPT

## 2025-08-08 DIAGNOSIS — R92.8 OTHER ABNORMAL AND INCONCLUSIVE FINDINGS ON DIAGNOSTIC IMAGING OF BREAST: ICD-10-CM

## 2025-08-11 ENCOUNTER — APPOINTMENT (OUTPATIENT)
Dept: INTERNAL MEDICINE | Facility: CLINIC | Age: 70
End: 2025-08-11
Payer: MEDICARE

## 2025-08-11 DIAGNOSIS — K59.00 CONSTIPATION, UNSPECIFIED: ICD-10-CM

## 2025-08-11 DIAGNOSIS — M32.8 OTHER FORMS OF SYSTEMIC LUPUS ERYTHEMATOSUS: ICD-10-CM

## 2025-08-11 DIAGNOSIS — E03.9 HYPOTHYROIDISM, UNSPECIFIED: ICD-10-CM

## 2025-08-11 DIAGNOSIS — I10 ESSENTIAL (PRIMARY) HYPERTENSION: ICD-10-CM

## 2025-08-11 DIAGNOSIS — K13.0 DISEASES OF LIPS: ICD-10-CM

## 2025-08-11 PROCEDURE — 99214 OFFICE O/P EST MOD 30 MIN: CPT | Mod: 2W

## 2025-08-11 RX ORDER — POLYETHYLENE GLYCOL 3350 17 G/17G
17 POWDER, FOR SOLUTION ORAL
Qty: 1 | Refills: 0 | Status: ACTIVE | COMMUNITY
Start: 2025-08-11 | End: 1900-01-01

## 2025-08-12 PROBLEM — K13.0 CHAPPED LIPS: Status: ACTIVE | Noted: 2025-08-12

## 2025-09-19 ENCOUNTER — NON-APPOINTMENT (OUTPATIENT)
Age: 70
End: 2025-09-19

## 2025-09-19 ENCOUNTER — TRANSCRIPTION ENCOUNTER (OUTPATIENT)
Age: 70
End: 2025-09-19